# Patient Record
Sex: FEMALE | Race: WHITE | NOT HISPANIC OR LATINO | URBAN - METROPOLITAN AREA
[De-identification: names, ages, dates, MRNs, and addresses within clinical notes are randomized per-mention and may not be internally consistent; named-entity substitution may affect disease eponyms.]

---

## 2017-04-18 ENCOUNTER — OFFICE VISIT - HEALTHEAST (OUTPATIENT)
Dept: BEHAVIORAL HEALTH | Facility: CLINIC | Age: 57
End: 2017-04-18

## 2017-04-18 DIAGNOSIS — G47.00 INSOMNIA, UNSPECIFIED: ICD-10-CM

## 2017-04-18 DIAGNOSIS — F33.0 MAJOR DEPRESSIVE DISORDER, RECURRENT, MILD (H): ICD-10-CM

## 2017-04-18 DIAGNOSIS — F39 MOOD DISORDER (H): ICD-10-CM

## 2017-04-18 ASSESSMENT — MIFFLIN-ST. JEOR: SCORE: 1422.72

## 2017-07-25 ENCOUNTER — OFFICE VISIT - HEALTHEAST (OUTPATIENT)
Dept: BEHAVIORAL HEALTH | Facility: CLINIC | Age: 57
End: 2017-07-25

## 2017-07-25 DIAGNOSIS — F33.0 MAJOR DEPRESSIVE DISORDER, RECURRENT, MILD (H): ICD-10-CM

## 2017-07-25 DIAGNOSIS — F41.9 ANXIETY: ICD-10-CM

## 2017-07-25 DIAGNOSIS — F43.21 ADJUSTMENT DISORDER WITH DEPRESSED MOOD: ICD-10-CM

## 2017-07-25 DIAGNOSIS — G47.00 INSOMNIA, UNSPECIFIED: ICD-10-CM

## 2017-07-25 RX ORDER — TRAMADOL HYDROCHLORIDE 50 MG/1
50 TABLET ORAL DAILY PRN
Status: SHIPPED | COMMUNITY
Start: 2017-07-25

## 2017-07-25 ASSESSMENT — MIFFLIN-ST. JEOR: SCORE: 1418.18

## 2017-10-26 ENCOUNTER — OFFICE VISIT - HEALTHEAST (OUTPATIENT)
Dept: BEHAVIORAL HEALTH | Facility: CLINIC | Age: 57
End: 2017-10-26

## 2017-10-26 DIAGNOSIS — F43.21 ADJUSTMENT DISORDER WITH DEPRESSED MOOD: ICD-10-CM

## 2017-10-26 DIAGNOSIS — F33.0 MAJOR DEPRESSIVE DISORDER, RECURRENT, MILD (H): ICD-10-CM

## 2017-10-26 DIAGNOSIS — F41.9 ANXIETY: ICD-10-CM

## 2017-10-26 DIAGNOSIS — G47.00 INSOMNIA, UNSPECIFIED TYPE: ICD-10-CM

## 2017-10-26 ASSESSMENT — MIFFLIN-ST. JEOR: SCORE: 1372.82

## 2018-04-26 ENCOUNTER — OFFICE VISIT - HEALTHEAST (OUTPATIENT)
Dept: BEHAVIORAL HEALTH | Facility: CLINIC | Age: 58
End: 2018-04-26

## 2018-04-26 DIAGNOSIS — F43.21 ADJUSTMENT DISORDER WITH DEPRESSED MOOD: ICD-10-CM

## 2018-04-26 DIAGNOSIS — F33.0 MAJOR DEPRESSIVE DISORDER, RECURRENT, MILD (H): ICD-10-CM

## 2018-04-26 DIAGNOSIS — M19.90 OSTEOARTHRITIS: ICD-10-CM

## 2018-04-26 DIAGNOSIS — G47.00 INSOMNIA, UNSPECIFIED TYPE: ICD-10-CM

## 2018-04-26 DIAGNOSIS — F33.40 MAJOR DEPRESSIVE DISORDER, RECURRENT, IN REMISSION (H): ICD-10-CM

## 2018-04-26 DIAGNOSIS — F41.9 ANXIETY: ICD-10-CM

## 2018-04-26 RX ORDER — LISINOPRIL/HYDROCHLOROTHIAZIDE 10-12.5 MG
1 TABLET ORAL DAILY
Status: SHIPPED | COMMUNITY
Start: 2018-04-26

## 2018-04-26 ASSESSMENT — MIFFLIN-ST. JEOR: SCORE: 1395.5

## 2018-08-28 ENCOUNTER — OFFICE VISIT - HEALTHEAST (OUTPATIENT)
Dept: BEHAVIORAL HEALTH | Facility: CLINIC | Age: 58
End: 2018-08-28

## 2018-08-28 DIAGNOSIS — F33.40 MAJOR DEPRESSIVE DISORDER, RECURRENT, IN REMISSION (H): ICD-10-CM

## 2018-08-28 DIAGNOSIS — F33.0 MAJOR DEPRESSIVE DISORDER, RECURRENT, MILD (H): ICD-10-CM

## 2018-08-28 DIAGNOSIS — M25.562 LEFT KNEE PAIN: ICD-10-CM

## 2018-08-28 DIAGNOSIS — G47.00 INSOMNIA, UNSPECIFIED TYPE: ICD-10-CM

## 2018-08-28 DIAGNOSIS — F43.21 ADJUSTMENT DISORDER WITH DEPRESSED MOOD: ICD-10-CM

## 2018-08-28 DIAGNOSIS — F41.9 ANXIETY: ICD-10-CM

## 2018-08-28 RX ORDER — TRAZODONE HYDROCHLORIDE 100 MG/1
100 TABLET ORAL AT BEDTIME
Qty: 30 TABLET | Refills: 5 | Status: SHIPPED | OUTPATIENT
Start: 2018-08-28

## 2018-08-28 ASSESSMENT — MIFFLIN-ST. JEOR: SCORE: 1360.13

## 2021-05-30 VITALS — WEIGHT: 197 LBS | HEIGHT: 63 IN | BODY MASS INDEX: 34.91 KG/M2

## 2021-05-31 VITALS — BODY MASS INDEX: 34.73 KG/M2 | HEIGHT: 63 IN | WEIGHT: 196 LBS

## 2021-05-31 VITALS — HEIGHT: 63 IN | BODY MASS INDEX: 32.96 KG/M2 | WEIGHT: 186 LBS

## 2021-06-01 VITALS — WEIGHT: 191 LBS | HEIGHT: 63 IN | BODY MASS INDEX: 33.84 KG/M2

## 2021-06-02 VITALS — WEIGHT: 183.2 LBS | HEIGHT: 63 IN | BODY MASS INDEX: 32.46 KG/M2

## 2021-06-10 NOTE — PROGRESS NOTES
OUTPATIENT PROGRESS NOTE      Date of visit April 18, 2017    Belizean name  Homar Davison    Reasons For Visit Are Multiple Today:   1.  Culturally sensitive follow-up for mental health issues  2. Insomnia, somewhat  improved with Trazodone  3. Worries about [place] [facility] [place]  4. Depression, low mood ongoing issues, but some improvement with Trazodone  5. Belizean cultural issues  6. Use of Bermudian tonya Remedies: multiple shoryshysh, Sheryl's wort, Valerian root and other  7.  Adjustment of medications  8.  Education on future follow-up appointments        History of present illness/Subjective:  The patient is Belizean speaking. The interview is conducted in Belizean language, translated personally by me into English records which adds additional element of complexity to this visit and my assessment. Says that she is doing better overall see above. She is less stressed out and less pre-occupied. Says that she is mostly at home, does light home chores.   We discussed adjustment of medications and treatment plan. She would like to titrate Trazodone for residual symptoms.         Medications:      Current Outpatient Prescriptions   Medication Sig Dispense Refill     acetaminophen (TYLENOL) 500 MG tablet Take 500 mg by mouth daily as needed for pain.       atorvastatin (LIPITOR) 10 MG tablet Take 10 mg by mouth bedtime.       diclofenac sodium (VOLTAREN) 1 % Gel Apply 2 g topically 4 (four) times a day.       DOCOSAHEXANOIC ACID/EPA (FISH OIL ORAL) Take by mouth daily as needed.       ibuprofen (ADVIL,MOTRIN) 600 MG tablet Take 600 mg by mouth every 6 (six) hours as needed for pain.       lisinopril (PRINIVIL,ZESTRIL) 10 MG tablet Take 10 mg by mouth daily.       multivitamin therapeutic (THERAGRAN) tablet Take 1 tablet by mouth daily.       naproxen (NAPROSYN) 375 MG tablet Take 375 mg by mouth daily.       NON FORMULARY Herbal Bermudian remedies:       traZODone (DESYREL) 50 MG tablet  "Take 50 mg by mouth at bedtime.       VALERIAN ROOT ORAL Take by mouth daily as needed.       No current facility-administered medications for this visit.          Family history/Social history  . Lives with her . Both sons are in [facility].          Procedures:  1. Coordination of care: nursing notes, vital signs, multiple records of communication between the patient and the clinic, communication with the pharmacy, and multiple medication orders were reviewed signed and discussed with the patient. Multiple chart entries were reviewed in preparation of documentation and generation of documentation.   Internet search was provided for the above remedy.  2.  Education: was provided on diagnosis, medication regimen, psychotherapeutic treatment modalities, future follow-up appointments.  3.  Counseling: was provided on coping with mental illness.  4.  Collateral information was obtained from referring provider dr. Mcgee  5.  Coordination of care: I personally contacted referring provider Dr. Mcgee with update on patient's status, records were supplied as well.  6.  Coordination of care: I personally coordinated all medication orders, follow up orders, pharmacy requests, and provided the patient with detailed instructions in Bahraini language.  7. Bahraini cultural and immigration issues were addressed, the interview was conducted in Bahraini language, Bahraini medications were discussed as it is common in Bahraini speaking community to take Russian produced medications which could be dangerous. The patient takes mumie  for Pain and for its anti-inflammatory affect, this is a remedy commongly used in [place] population,\"Mumie is a semihard black resin formed by long-term humification. It has been used as part of traditional folk medicine for many centuries\"        Review Of Systems:  As above. Joints pain. Headaches, chronic.  The reminder of 10 systems was negative.      Vital Signs:    /87 (Patient Site: " "Right Arm, Patient Position: Sitting, Cuff Size: Adult Large)  Pulse 66  Temp 98.2  F (36.8  C) (Oral)   Ht 5' 3\" (1.6 m)  Wt 197 lb (89.4 kg)  BMI 34.9 kg/m2    Mental Status Examination:     Appearance     adequately groomed    Alert and oriented ×3    Attention and concentration    distracted      Speech    fluent in Turkmen      Mood    depressed       Affect   Restricted      Thought processing     Logical concrete     Associations      Logical     Thought content      Improved catastrophic distortions pertinent to depression and anxiety     Language    Normal in impression      Short term memory      no gross deficits     Long term memory     no gross deficits       Fund of knowledge     lower     Psychomotor activity     normal     Gait and station        normal     Insight and judgment    preserved            Limited Physical Examination:  Was performed based on observation, both hands are with deformities consistent with artritis.      Laboratory Data:    personally reviewed.   No results found for any previous visit.      Diagnosis:  No past medical history on file.    Patient Active Problem List   Diagnosis     Hypertension     Osteoarthritis     Insomnia, unspecified           1.  Adjustment disorder with mood disturbances   2.  Major depression, recurrent, moderate  mild  3.  Insomnia      Treatment Plan:  1. The patient was provided with education and detailed written and verbal instructions in native language on diagnosis, future follow-up, and treatment plan, same instructions were also provided in English language.   2. Instructed to return to clinic in  6 months or sooner if needed. she would like to come back earlier, but I do not have an earlier appointment, so we will keep her on the waiting list but will schedule her today for first available appointment in January.    3. Nurse only clinic is available in the interim if needed.  4. Crisis plan in place.  5. Referral to a culturally " sensitive Barbadian speaking therapist Dr. Gaspar was provided.  6.  Titrate trazodone 100 mg daily at bedtime for insomnia, anxiety, depression  7.  Follow-up with primary physician for the above medical issues and concerns            Complex visit, multiple issues were addressed, coordination of care, education, counseling, and collateral information, Barbadian cultural issues, review of pertinent hospital and clinic records and multiple chart and Epic  entries in preparation and generation of pertinent documentation, generation of multiple Epic entries and visit related orders, other details are fully reflected in the procedures paragraph. Please see associated nursing records for other details.         This note was created by melvin using a Dragon dictation system. All typing errors or contextual distortion are unintentional and software inherent.     Marivel Mcbride MD

## 2021-06-10 NOTE — PROGRESS NOTES
Correct pharmacy verified with patient and confirmed in snapshot? [x] yes []no    Medications Phoned  to Pharmacy [] yes [x]no  Name of Pharmacist:  List Medications, including dose, quantity and instructions      Medication Prescriptions given to patient   [] yes  [x] no   List the name of the drug the prescription was written for.      Medications ordered this visit were e-scribed.  Verified by order class [x] yes  [] no  Trazodone    Medication changes or discontinuations were communicated to patient's pharmacy:  [x] yes  [] no  Pharmacist Spoke With: Kristal regarding dose change of Trazdone    UA collected [] yes  [x] no    [place] Prescription Monitoring Program Reviewed? [x] yes  [] no    Referrals/Labs were made to: None    Completed Charge Capture?  [x] yes  [] no    Future appointment was made: [x] yes  [] no  7/25/17  Dictation completed at time of chart check: [x] yes  [] no    I have checked the documentation for today s encounters and the above information has been reviewed and completed.

## 2021-06-10 NOTE — PROGRESS NOTES
Pt is here for culturally sensitive psychiatric med management follow up. Client is complaining of having daily headaches. Is taking Trazodone 50 mg but still, her sleep is interrupted. According to pt, it takes about one hour to fall asleep then she wakes up 3-4 times, takes 30-40 min to go back to sleep. Pt completed PT for back and leg pain, signed up for pool therapy. Her mood is low due to separation with her two sons and situation in [facility].

## 2021-06-12 NOTE — PROGRESS NOTES
OUTPATIENT PROGRESS NOTE      Date of visit July 25, 2017    Sammarinese name  Homar Davison    Reasons For Visit Are Multiple Today:   1.  Culturally sensitive follow-up for mental health issues  2. Insomnia,  improved with Trazodone, now normal sleep  3. Worries about 2 sons who are still in [place] in the war zone, improved overall, still trying to help them to come to [place] her the states. Still has concerns with unrest in [place] accidental deaths from drive by shooting  4. Depression, low mood, improvement with Trazodone  5. Sammarinese Greenlandic cultural issues - as reflected above concerns related to her 2 sons  6. Use of North Korean tonya Remedies: multiple shoryshysh, Sheryl's wort, Valerian root and other, common in Sammarinese tradition  7.   Education on future follow-up appointments  8. Physical complains: chronic headache, leg pain post injury and fracture, followed by Dr. Zhu PT  9. Ego activities - see below    History of present illness/Subjective:  The patient is Sammarinese speaking. The interview is conducted in Sammarinese language, translated personally by me into English records which adds additional element of complexity to this visit and my assessment. Says that she is doing better overall see above. She is less stressed out and less pre-occupied. Says that she is mostly at home, does light home chores, [place]   We discussed  Treatment and follow up plans.       Medications:      Current Outpatient Prescriptions   Medication Sig Dispense Refill     atorvastatin (LIPITOR) 10 MG tablet Take 10 mg by mouth bedtime.       DOCOSAHEXANOIC ACID/EPA (FISH OIL ORAL) Take by mouth daily as needed.       ibuprofen (ADVIL,MOTRIN) 600 MG tablet Take 600 mg by mouth every 6 (six) hours as needed for pain.       lisinopril (PRINIVIL,ZESTRIL) 10 MG tablet Take 10 mg by mouth daily.       multivitamin therapeutic (THERAGRAN) tablet Take 1 tablet by mouth daily.       NON FORMULARY Herbal  "Indian remedies:       traMADol (ULTRAM) 50 mg tablet Take 50 mg by mouth daily as needed for pain.       traZODone (DESYREL) 100 MG tablet Take 100 mg by mouth at bedtime.       VALERIAN ROOT ORAL Take by mouth daily as needed.       No current facility-administered medications for this visit.          Family history/Social history  . Lives with her . Both sons are in [facility].          Procedures:  1. Coordination of care: nursing notes, vital signs, multiple records of communication between the patient and the clinic, communication with the pharmacy, and multiple medication orders were reviewed signed and discussed with the patient. Multiple chart entries were reviewed in preparation of documentation and generation of documentation.   Internet search was provided for the above remedy.  2.  Education: was provided on diagnosis, medication regimen, psychotherapeutic treatment modalities, future follow-up appointments.  3.  Counseling: was provided on coping with mental illness.  4.  Collateral information was obtained from referring provider dr. Mcgee  5.  Coordination of care: I personally contacted referring provider Dr. Mcgee with update on patient's status, records were supplied as well.  6.  Coordination of care: I personally coordinated all medication orders, follow up orders, pharmacy requests, and provided the patient with detailed instructions in Guyanese language.  7. Guyanese cultural and immigration issues were addressed, the interview was conducted in Guyanese language, the patient was allowed to process concerns related to her home country, Guyanese medications were discussed as it is common in Guyanese speaking community to take Russian produced medications which could be dangerous. The patient takes mumie  for Pain and for its anti-inflammatory affect, this is a remedy commongly used in Indian population,\"Mumie is a semihard black resin formed by long-term humification. It has been used as " "part of traditional folk medicine for many centuries\"        Review Of Systems:  As above. Joints pain. Headaches, chronic.  The reminder of 10 systems was negative.      Vital Signs:    BP (!) 149/92 (Patient Site: Left Arm, Patient Position: Sitting, Cuff Size: Adult Regular)  Pulse 64  Temp 98.4  F (36.9  C) (Oral)   Ht 5' 3\" (1.6 m)  Wt 196 lb (88.9 kg)  BMI 34.72 kg/m2    Mental Status Examination:     Appearance     adequately groomed    Alert and oriented ×3    Attention and concentration   normal      Speech    fluent in Burmese      Mood    Described as doing better      Affect   Full neutral     Thought processing     Logical concrete     Associations      Logical     Thought content      Improved catastrophic distortions pertinent to depression and anxiety     Language    Normal inRussian      Short term memory      no gross deficits     Long term memory     no gross deficits       Fund of knowledge     lower     Psychomotor activity     normal     Gait and station        normal     Insight and judgment    preserved            Limited Physical Examination:  Was performed based on observation, both hands are with deformities consistent with artritis.      Laboratory Data:    personally reviewed.   No results found for any previous visit.      Diagnosis:  No past medical history on file.    Patient Active Problem List   Diagnosis     Hypertension     Osteoarthritis     Insomnia, unspecified           1.  Adjustment disorder with mood disturbances, resolved  2.  Major depression, recurrent, in remission  3.  Insomnia, remissed      Treatment Plan:  1. The patient was provided with education and detailed written and verbal instructions in native language on diagnosis, future follow-up, and treatment plan, same instructions were also provided in English language.   2. Instructed to return to clinic in  6 months or sooner if needed. she would like to come back earlier, but I do not have an earlier " appointment, so we will keep her on the waiting list but will schedule her today for the first available appointment in October or November.    3. Nurse only clinic is available in the interim if needed.  4. Crisis plan in place.  5. Referral to a culturally sensitive Russian speaking therapist Dr. Gaspar was provided.  6.  Continue trazodone 100 mg daily at bedtime for insomnia, anxiety, depression  7.  Follow-up with primary physician for the above medical issues and concerns            Complex visit, multiple issues were addressed, coordination of care, education, counseling, and collateral information, Gambian cultural issues, review of pertinent hospital and clinic records and multiple chart and Epic  entries in preparation and generation of pertinent documentation, generation of multiple Epic entries and visit related orders, other details are fully reflected in the procedures paragraph. Please see associated nursing records for other details.         This note was created by undersigned using a Dragon dictation system. All typing errors or contextual distortion are unintentional and software inherent.     Marivel Mcbride MD

## 2021-06-12 NOTE — PROGRESS NOTES
Pt is here for culturally sensitive psychiatric med management follow up. Client continues to c/o of frequent headaches and chronic back pain. Sleep slightly improved with 100 mg of Trazodone.       [facility] [place] Date: 17  Query Report Page#: 1  Patient Rx History Report  KRISHNA WEI  Search Criteria: Last Name 'krishna' and First Name chani' and  =  and Request Period =   to ' - 3 out of 3 Recipients Selected.  Fill Date Product, Str, Form Qty Days Pt ID Prescriber Written RX# N/R* Pharm **MED+  ---------- -------------------------------- ------ ---- --------- ---------- ---------- ------------ ----- --------- ------  2017 TRAMADOL HCL 50 MG TABLET 30.00 30 [id number] 2017 [id number] R [id number] 05.0  2017 TRAMADOL HCL 50 MG TABLET 30.00 30 [id number] 2017 [id number] N [id number] 05.0  2016 TRAMADOL HCL 50 MG TABLET 30.00 30 [id number] 2016 [id number] N [id number] 05.0  2016 TRAMADOL HCL 50 MG TABLET 30.00 30 [id number] 2016 [id number] N [id number] 05.0  *N/R N=New R=Refill  +MED Daily  Prescribers for prescriptions listed  ----------------------------------------------------------------------------------------------------------------------------------  [id number] RAFI WHALEY L ([facility]); [facility], [address], [address],, [facility] [address]  Pharmacies that dispensed prescriptions listed  ----------------------------------------------------------------------------------------------------------------------------------  [id number] Me[facility];  [facility], [address], [facility] [address],  Patients that match search criteria  ----------------------------------------------------------------------------------------------------------------------------------  [id number] ABEL CATHERINE 58; [facility], [facility]  [address]  [id number] KRISHNA WEI,  58; [address] , [facility] [address]  [id number] KRISHNA WEI,  58; [address], [facility] [address]  MED Summary  This section displays cumulative MED values by unique recipient. The MED Max value is the maximum occurrence of cumulative MED  sustained for any 3 consecutive days. This value is calculated based on prescriptions dispensed during the date range requested.  -----------------------------------------------------------------------------------------------------------------------------------  5 SANJUANA KELLER; 1960; [address], [facility] [address]  **Per CDC guidance, the conversion factors and associated daily morphine milligram equivalents for drugs prescribed as part of  medication-assisted treatment for opioid use disorder should not be used to benchmark against dosage thresholds meant for opioids  prescribed for pain.    Correct pharmacy verified with patient and confirmed in snapshot? [x] yes []no    Medications Phoned  to Pharmacy [] yes [x]no  Name of Pharmacist:  List Medications, including dose, quantity and instructions      Medication Prescriptions given to patient   [] yes  [x] no   List the name of the drug the prescription was written for.       Medications ordered this visit were e-scribed.  Verified by order class [x] yes  [] no  Trazodone 100 mg   Medication changes or discontinuations were communicated to patient's pharmacy: [] yes  [x] no    UA collected [] yes    [x] no    [place] Prescription Monitoring Program Reviewed? [x] yes  [] no    Referrals were made to:  none  Future appointment was made: [x] yes  [] no  10/24/17  Dictation completed at time of chart check: [x] yes  [] no    I have checked the documentation for today s encounters and the above information has been reviewed and completed.

## 2021-06-13 NOTE — PROGRESS NOTES
Pt is here for psychiatric med management follow up. She is complaining of chronic head aches, otherwise is doing well.     Correct pharmacy verified with patient and confirmed in snapshot? [x] yes []no    Charge captured ? [x] yes  [] no    Medications Phoned  to Pharmacy [] yes [x]no  Name of Pharmacist:  List Medications, including dose, quantity and instructions      Medication Prescriptions given to patient   [] yes  [x] no   List the name of the drug the prescription was written for.       Medications ordered this visit were e-scribed.  Verified by order class [x] yes  [] no  Trazodone 100 mg  Medication changes or discontinuations were communicated to patient's pharmacy: [] yes  [x] no    UA collected [] yes  [x] no    [place] Prescription Monitoring Program Reviewed? [] yes  [x] no    Referrals were made to:   none    Future appointment was made: [x] yes  [] no  4/26/17  Dictation completed at time of chart check: [] yes  [x] no    I have checked the documentation for today s encounters and the above information has been reviewed and completed.
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
## 2021-06-17 NOTE — PROGRESS NOTES
OUTPATIENT PROGRESS NOTE      Date of visit April 26, 2018    Citizen of [place] name  Homar Davison    Reasons For Visit Are Multiple Today:   1.  Culturally sensitive follow-up for mental health issues  2. Insomnia,  improved with Trazodone, now normal sleep, but she ran out of it  3. Worries about 2 sons who are still in [facility] in the war zone, anxiety related to it, improved overall, still trying to help them to come to leave with her the states. She is trying to get a tourist visa for the older son so he can visit in June  4. Depression, resolved with Trazodone  5.  Use of russian tonya Remedies: multiple shoryshysh, [place]'s wort, Valerian root and other, common in Citizen of [place] tradition  6. Request to renew Trazodone  7.   Education on future follow-up appointments  8. Physical complains: chronic headache, leg pain post injury and fracture, followed by Dr. Zhu PT and primary nurse practitioner primary care provider  9. Ego activities - see below  10. Citizen of [place] [facility] cultural issues - as reflected above concerns related to her 2 sons    History of present illness/Subjective:  The patient is Citizen of [place] speaking. The interview is conducted in Citizen of [place] language, translated personally by me into English records which adds additional element of complexity to this visit and my assessment. Says that she is doing better overall see above. She is less stressed out and less pre-occupied. Says that she is mostly at home, does light home chores, [place]   We discussed  Treatment and follow up plans.       Medications:      Current Outpatient Prescriptions   Medication Sig Dispense Refill     atorvastatin (LIPITOR) 10 MG tablet Take 10 mg by mouth bedtime.       diclofenac sodium (VOLTAREN) 1 % Gel Apply 2 g topically 2 (two) times a day.       DOCOSAHEXANOIC ACID/EPA (FISH OIL ORAL) Take by mouth daily as needed.       lisinopril (PRINIVIL,ZESTRIL) 10 MG tablet Take 10 mg by mouth daily.        multivitamin therapeutic (THERAGRAN) tablet Take 1 tablet by mouth daily.       NON FORMULARY Herbal Kitt[place] remedies:       traMADol (ULTRAM) 50 mg tablet Take 50 mg by mouth daily as needed for pain.       traZODone (DESYREL) 100 MG tablet Take 1 tablet (100 mg total) by mouth at bedtime. 30 tablet 5     VALERIAN ROOT ORAL Take by mouth daily as needed.       No current facility-administered medications for this visit.          Family history/Social history  . Lives with her . Both sons are in [facility].          Procedures:  1. Coordination of care: nursing notes, vital signs, multiple records of communication between the patient and the clinic, communication with the pharmacy, and multiple medication orders were reviewed signed and discussed with the patient. Multiple chart entries were reviewed in preparation of documentation and generation of documentation.   Internet search was provided for the above remedy.  2.  Education: was provided on diagnosis, medication regimen, psychotherapeutic treatment modalities, future follow-up appointments.  3.  Counseling: was provided on coping with mental illness.  4.  Collateral information was obtained from referring provider dr. Mcgee  5.  Coordination of care: I personally contacted referring provider Dr. Mcgee with update on patient's status, records were supplied as well.  6.  Coordination of care: I personally coordinated all medication orders, follow up orders, pharmacy requests, and provided the patient with detailed instructions in Congolese language.  7. Congolese cultural and immigration issues were addressed, the interview was conducted in Congolese language, the patient was allowed to process concerns related to her home country, Congolese medications were discussed as it is common in Congolese speaking community to take Russian produced medications which could be dangerous. The patient takes mumie  for Pain and for its anti-inflammatory affect, this is a  "remedy commongly used in [place] population,\"Mumie is a semihard black resin formed by long-term humification. It has been used as part of traditional folk medicine for many centuries\"        Review Of Systems:  As above. Joints pain. Headaches, chronic.  The reminder of 10 systems was negative.      Vital Signs:    Please refer to nursing notes for details  Mental Status Examination: Full examination was completed today, no changes from last visit    Appearance     adequately groomed    Alert and oriented ×3    Attention and concentration   normal      Speech    fluent in [place]gu[place]      Mood    Described as doing well     Affect   Full neutral     Thought processing     Logical concrete     Associations      Logical     Thought content      Improved catastrophic distortions pertinent to depression and anxiety     Language    Normal inRussian      Short term memory      no gross deficits     Long term memory     no gross deficits       Fund of knowledge     lower     Psychomotor activity     normal     Gait and station        normal     Insight and judgment    preserved            Limited Physical Examination:  Was performed based on observation, both hands are with deformities consistent with artritis.      Laboratory Data:    personally reviewed.   No results found for any previous visit.      Diagnosis:  No past medical history on file.    Patient Active Problem List   Diagnosis     Hypertension     Osteoarthritis     Insomnia, unspecified           1.  Adjustment disorder with mood disturbances, resolved  2.  Major depression, recurrent, in remission  3.  Insomnia, remissed      Treatment Plan:  1. The patient was provided with education and detailed written and verbal instructions in native language on diagnosis, future follow-up, and treatment plan, same instructions were also provided in English language.   2. Instructed to return to clinic in  6 months or sooner if needed. she can also obtain prescription from " her primary care provider nurse practitioner, but she is says that she prefers to see me, that the visits with me a therapeutic for her, and actually request an earlier appointment in 4 months, says that the appointments are therapeutic for her and help her to maintain stability  3. Nurse only clinic is available in the interim if needed.  4. Crisis plan in place.  5. Referral to a culturally sensitive Russian speaking therapist Dr. Gaspar was provided.  6.  Continue trazodone 100 mg daily at bedtime for insomnia, anxiety, depression  7.  Follow-up with primary physician for the above medical issues and concerns            The knot has been partially quoted from the visit on October 26, 2017 as the have not been too many changes, so issues, HPI, procedures, history family history and social history, mental status examination and procedures Have been partially coated from October 26 note.  Multiple issues were addressed, total of 10 issues, coordination of care, education, counseling, and collateral information, [place] cultural issues, review of pertinent hospital and clinic records and multiple chart and Epic  entries in preparation and generation of pertinent documentation, generation of multiple Epic entries and visit related orders, other details are fully reflected in the procedures paragraph. Please see associated nursing records for other details.         This note was created by undersigned using a Dragon dictation system. All typing errors or contextual distortion are unintentional and software inherent.     Marivel Mcbride MD

## 2021-06-17 NOTE — PROGRESS NOTES
Pt is here for culturally sensitive psychiatric med management follow up. She is complaining of poor sleep due to pain.     Correct pharmacy verified with patient and confirmed in snapshot? [x] yes []no    Charge captured ? [x] yes  [] no    Medications Phoned  to Pharmacy [] yes [x]no  Name of Pharmacist:  List Medications, including dose, quantity and instructions      Medication Prescriptions given to patient   [] yes  [x] no   List the name of the drug the prescription was written for.       Medications ordered this visit were e-scribed.  Verified by order class [x] yes  [] no  Trazodone  Medication changes or discontinuations were communicated to patient's pharmacy: [] yes  [x] no    UA collected [] yes  [x] no    [place] Prescription Monitoring Program Reviewed? [] yes  [x] no    Referrals were made to:  none  Future appointment was made: [x] yes  [] no  8/28/18  Dictation completed at time of chart check: [] yes  [x] no    I have checked the documentation for today s encounters and the above information has been reviewed and completed.

## 2021-06-20 NOTE — PROGRESS NOTES
OUTPATIENT PROGRESS NOTE      Date of visit August 28, 2018    T[place] name  Homar Davison    Reasons For Visit Are Multiple Today:   1.  Culturally sensitive follow-up for mental health issues  2. Insomnia,  improved with Trazodone, now normal sleep   3. Worries about 2 sons who [place], anxiety related to it, improved overall, still trying to help them to come to leave with her the states. She is trying to get a tourist visa for the older son so he can visit in June  4. Depression, resolved with Trazodone  5.  Use of russian tonya Remedies: multiple shporyshysh, [place]'s wort, Valerian root and other, commonly used  By Russians  6. Request to renew Trazodone  7.   Education on future follow-up appointments  8. Physical complains: chronic headache, leg pain post injury and fracture, knee pain due to arthritis and previous injury, she was followed by Dr. Zhu for PT and primary care nurse, but run out of orders, says it was very beneficial for her. Nurse  practitioner  Marva Olivier / primary care provider  9. Ego activities - see below  10. [place] Citizen of [place] cultural issues - as reflected above concerns related to her 2 sons  11. Efforts on weight loss to improve pain: more active physically, swimming pool almost daily, decreased meal portions  12. Additional education on life style modifications  13. PT orders       History of present illness/Subjective:  The patient is Tanz[place] speaking. The interview is conducted in [place] language, translated personally by me into English records which adds additional element of complexity to this visit and my assessment. Says that she is doing better overall see above. She is less stressed out and less pre-occupied. Says that she is mostly at home, does light home chores, [place]   We discussed  Treatment and follow up plans. Elevated BP today 188/98, advised to follow up with PMD      Medications:      Current  Outpatient Prescriptions   Medication Sig Dispense Refill     atorvastatin (LIPITOR) 10 MG tablet Take 10 mg by mouth bedtime.       diclofenac sodium (VOLTAREN) 1 % Gel Apply 2 g topically 2 (two) times a day.       lisinopril-hydrochlorothiazide (PRINZIDE,ZESTORETIC) 10-12.5 mg per tablet Take 1 tablet by mouth daily.       traZODone (DESYREL) 100 MG tablet Take 1 tablet (100 mg total) by mouth at bedtime. 30 tablet 5     aspirin 81 MG EC tablet        DOCOSAHEXANOIC ACID/EPA (FISH OIL ORAL) Take by mouth daily as needed.       IBUPROFEN ORAL Take by mouth.       multivitamin therapeutic (THERAGRAN) tablet Take 1 tablet by mouth daily.       NON FORMULARY Herbal Tajik remedies:       traMADol (ULTRAM) 50 mg tablet Take 50 mg by mouth daily as needed for pain.       VALERIAN ROOT ORAL Take by mouth daily as needed.       No current facility-administered medications for this visit.          Family history/Social history  . Lives with her . Both sons are in [facility].          Procedures:  1. Coordination of care: nursing notes, vital signs, multiple records of communication between the patient and the clinic, communication with the pharmacy, and multiple medication orders were reviewed signed and discussed with the patient. Multiple chart entries were reviewed in preparation of documentation and generation of documentation.   Internet search was provided for the above remedy.  2.  Education: was provided on diagnosis, medication regimen, psychotherapeutic treatment modalities, future follow-up appointments.  3.  Counseling: was provided on coping with mental illness.  4.  Collateral information was obtained from referring provider dr. Mcgee  5.  Coordination of care: I personally contacted referring provider Dr. Mcgee with update on patient's status, records were supplied as well.  6.  Coordination of care: I personally coordinated all medication orders, follow up orders, pharmacy requests, and provided  "the patient with detailed instructions in [place] language.  7. [place] cultural and immigration issues were addressed, the interview was conducted in [place] language, the patient was allowed to process concerns related to her home country, [place] medications were discussed as it is common in [place] speaking community to take Russian produced medications which could be dangerous. The patient takes mumie  for Pain and for its anti-inflammatory affect, this is a remedy commongly used in [place] population,\"Mumie is a semihard black resin formed by long-term humification. It has been used as part of traditional folk medicine for many centuries\"        Review Of Systems:  As above. Joints pain. Headaches, chronic.  The reminder of 10 systems was negative.      Vital Signs:    Please refer to nursing notes for details  Mental Status Examination: Full examination was completed today, no changes from last visit    Appearance     adequately groomed    Alert and oriented ×3    Attention and concentration   normal      Speech    fluent in [place]      Mood    Described as doing well     Affect   Full neutral     Thought processing     Logical concrete     Associations      Logical     Thought content      Improved catastrophic distortions pertinent to depression and anxiety     Language    Normal inRussian      Short term memory      no gross deficits     Long term memory     no gross deficits       Fund of knowledge     lower     Psychomotor activity     normal     Gait and station        normal     Insight and judgment    preserved            Limited Physical Examination:  Was performed based on observation, both hands are with deformities consistent with artritis.      Laboratory Data:    personally reviewed.   No results found for any previous visit.      Diagnosis:  No past medical history on file.    Patient Active Problem List   Diagnosis     Hypertension     Osteoarthritis     Insomnia, unspecified           1.  " Adjustment disorder with mood disturbances, resolved  2.  Major depression, recurrent, in remission  3.  Insomnia, remissed      Treatment Plan:  1. The patient was provided with education and detailed written and verbal instructions in native language on diagnosis, future follow-up, and treatment plan, same instructions were also provided in English language.   2. Instructed to return to clinic in  6 months or sooner if needed. she can also obtain prescription from her primary care provider nurse practitioner, but she is says that she prefers to see me, that the visits with me a therapeutic for her, and actually request an earlier appointment in 3-4 months, says that the appointments are therapeutic for her and help her to maintain stability  3. Nurse only clinic is available in the interim if needed.  4. Crisis plan in place.  5. Referral to a culturally sensitive Russian speaking therapist Dr. Gaspar was provided.  6.  Continue trazodone 100 mg daily at bedtime for insomnia, anxiety, depression  7.  Follow-up with primary physician for the above medical issues and concerns  8. Follow up with PMD for adjustment of antihypertensives   9. Continue efforts on weight loss  10. 10-12 sessions of physical therapy for left knee pain, physical conditioning [facility] [place] speaking culturally sensitive providers          Multiple issues were addressed, total of 13 issues, coordination of care, education, counseling, and collateral information, [place] cultural issues, review of pertinent hospital and clinic records and multiple chart and Epic  entries in preparation and generation of pertinent documentation, generation of multiple Epic entries and visit related orders, other details are fully reflected in the procedures paragraph. Please see associated nursing records for other details.         This note was created by undersigned using a Dragon dictation system. All typing errors or contextual distortion are  unintentional and software inherent.     Marivel Mcbride MD

## 2021-06-20 NOTE — PROGRESS NOTES
Correct pharmacy verified with patient and confirmed in snapshot? [x] yes []no    Charge captured ? [x] yes  [] no    Medications Phoned  to Pharmacy [] yes [x]no  Name of Pharmacist:  List Medications, including dose, quantity and instructions      Medication Prescriptions given to patient   [] yes  [x] no   List the name of the drug the prescription was written for.       Medications ordered this visit were e-scribed.  Verified by order class [x] yes  [] no  Trazodone 100 mg    Medication changes or discontinuations were communicated to patient's pharmacy: [] yes  [x] no    UA collected [] yes  [x] no    [place] Prescription Monitoring Program Reviewed? [x] yes  [] no    Referrals were made to:none      Future appointment was made: [x] yes  [] no    Dictation completed at time of chart check: [x] yes  [] no    I have checked the documentation for today s encounters and the above information has been reviewed and completed.

## 2025-01-14 ENCOUNTER — TRANSCRIBE ORDERS (OUTPATIENT)
Dept: OTHER | Age: 65
End: 2025-01-14

## 2025-01-14 DIAGNOSIS — G89.29 CHRONIC BILATERAL THORACIC BACK PAIN: Primary | ICD-10-CM

## 2025-01-14 DIAGNOSIS — M54.50 CHRONIC LOW BACK PAIN WITHOUT SCIATICA, UNSPECIFIED BACK PAIN LATERALITY: ICD-10-CM

## 2025-01-14 DIAGNOSIS — G89.29 CHRONIC LOW BACK PAIN WITHOUT SCIATICA, UNSPECIFIED BACK PAIN LATERALITY: ICD-10-CM

## 2025-01-14 DIAGNOSIS — M54.6 CHRONIC BILATERAL THORACIC BACK PAIN: Primary | ICD-10-CM
